# Patient Record
Sex: MALE | Race: WHITE | Employment: UNEMPLOYED | URBAN - METROPOLITAN AREA
[De-identification: names, ages, dates, MRNs, and addresses within clinical notes are randomized per-mention and may not be internally consistent; named-entity substitution may affect disease eponyms.]

---

## 2022-10-19 ENCOUNTER — HOSPITAL ENCOUNTER (INPATIENT)
Age: 52
LOS: 2 days | Discharge: HOME | DRG: 380 | End: 2022-10-21
Attending: INTERNAL MEDICINE | Admitting: INTERNAL MEDICINE

## 2022-10-19 PROBLEM — K92.2 UPPER GI BLEED: Status: ACTIVE | Noted: 2022-10-19

## 2022-10-19 PROCEDURE — 99223 1ST HOSP IP/OBS HIGH 75: CPT | Performed by: INTERNAL MEDICINE

## 2022-10-19 PROCEDURE — 74011000250 HC RX REV CODE- 250: Performed by: INTERNAL MEDICINE

## 2022-10-19 PROCEDURE — C9113 INJ PANTOPRAZOLE SODIUM, VIA: HCPCS | Performed by: INTERNAL MEDICINE

## 2022-10-19 PROCEDURE — 74011250636 HC RX REV CODE- 250/636: Performed by: INTERNAL MEDICINE

## 2022-10-19 PROCEDURE — 65270000046 HC RM TELEMETRY

## 2022-10-19 RX ORDER — PANTOPRAZOLE SODIUM 40 MG/10ML
40 INJECTION, POWDER, LYOPHILIZED, FOR SOLUTION INTRAVENOUS EVERY 12 HOURS
Status: DISCONTINUED | OUTPATIENT
Start: 2022-10-19 | End: 2022-10-20

## 2022-10-19 RX ORDER — MORPHINE SULFATE 2 MG/ML
2 INJECTION, SOLUTION INTRAMUSCULAR; INTRAVENOUS
Status: DISCONTINUED | OUTPATIENT
Start: 2022-10-19 | End: 2022-10-19

## 2022-10-19 RX ORDER — MORPHINE SULFATE 2 MG/ML
2 INJECTION, SOLUTION INTRAMUSCULAR; INTRAVENOUS
Status: DISCONTINUED | OUTPATIENT
Start: 2022-10-19 | End: 2022-10-21 | Stop reason: HOSPADM

## 2022-10-19 RX ORDER — SODIUM CHLORIDE 0.9 % (FLUSH) 0.9 %
5-40 SYRINGE (ML) INJECTION AS NEEDED
Status: DISCONTINUED | OUTPATIENT
Start: 2022-10-19 | End: 2022-10-21 | Stop reason: HOSPADM

## 2022-10-19 RX ORDER — SODIUM CHLORIDE 0.9 % (FLUSH) 0.9 %
5-40 SYRINGE (ML) INJECTION EVERY 8 HOURS
Status: DISCONTINUED | OUTPATIENT
Start: 2022-10-19 | End: 2022-10-21 | Stop reason: HOSPADM

## 2022-10-19 RX ADMIN — MORPHINE SULFATE 2 MG: 2 INJECTION, SOLUTION INTRAMUSCULAR; INTRAVENOUS at 20:34

## 2022-10-19 RX ADMIN — SODIUM CHLORIDE, PRESERVATIVE FREE 10 ML: 5 INJECTION INTRAVENOUS at 21:16

## 2022-10-19 RX ADMIN — SODIUM CHLORIDE, PRESERVATIVE FREE 10 ML: 5 INJECTION INTRAVENOUS at 16:36

## 2022-10-19 RX ADMIN — MORPHINE SULFATE 2 MG: 2 INJECTION, SOLUTION INTRAMUSCULAR; INTRAVENOUS at 23:49

## 2022-10-19 RX ADMIN — MORPHINE SULFATE 2 MG: 2 INJECTION, SOLUTION INTRAMUSCULAR; INTRAVENOUS at 16:36

## 2022-10-19 RX ADMIN — PANTOPRAZOLE SODIUM 40 MG: 40 INJECTION, POWDER, FOR SOLUTION INTRAVENOUS at 20:39

## 2022-10-19 NOTE — H&P
Hospitalist Admission History and Physical    NAME:  Ryan Quiros   :   1970   MRN:   [id number]     PCP:  None  Date/Time:  10/19/2022 5:41 PM  Subjective:   CHIEF COMPLAINT:  transferred from [facility], hematamesis  HISTORY OF PRESENT ILLNESS:     Shakir Pascual is a 45 y.o.   male w/ h/o gastric sleeve w/ complications who is transferred from [address] [place] [address] after he presented there w/ c/o hematemesis onset 10/15. He reports that since his gastric sleeve in  he has had chronic abdominal pain and had an acute exacerbation of the pain the week prior the onset of his hematemesis. He reports that the first time he threw up about 16 oz of blood along with a large clot and he had several more episodes while at home and after he presented to [facility]. He also reports having melena the next day. At [address] [place] [address] it looks like he received PRBC transfusion and was given PPI. Since there is no GI coverage there he has been transferred here for further eval and management. At the time of my eval he appeared comfortable and had no complaints. He reports having very prominent and present Hiccups that resolved after hematemesis. PMHx: Raynaud's disease  -HTN  -One episode of seizure for which he is not on AED's  -Binge EtOH use  -Chronic abdominal pain: states he was in a pain management clinic for his chronic pain    Past surgical history:  Gastric sleeve in       SHx: no tobacco use, drinks EtOH for about 7 days on and off per his description. Denies recreational drug use     No family history on file. Allergies   Allergen Reactions    Gabapentin Hives    Tape [Adhesive] Rash        None       REVIEW OF SYSTEMS:    Please see above otw 10 point ROS checked and negative.       Objective:   VITALS:    Visit Vitals  BP (!) 146/99   Pulse 61   Temp 98 °F (36.7 °C)   Resp 18   SpO2 100%     Temp (24hrs), Av °F (36.7 °C), Min:98 °F (36.7 °C), Max:98 °F (36.7 °C)      PHYSICAL EXAM:   General:    Alert, cooperative, no distress, appears stated age. Head:   Normocephalic, without obvious abnormality, atraumatic. Eyes:   Conjunctivae clear, anicteric sclerae. Pupils are equal  Nose:  Nares normal. No drainage. Throat:    Lips, mucosa, and tongue normal.  No Thrush  Neck:  Supple, symmetrical,  no adenopathy,      and no JVD. Lungs:   Clear to auscultation bilaterally. No Wheezing or Rhonchi. No rales. Chest wall:  No tenderness or deformity. No Accessory muscle use. Heart:   Regular rate and rhythm,  no murmur, rub or gallop. Abdomen:   Soft, non-tender. Not distended. Bowel sounds normal. No masses  Extremities: Extremities normal, atraumatic, No cyanosis. No edema. No clubbing  Skin:     Texture, turgor normal. No rashes or lesions. Not Jaundiced  Lymph nodes: Cervical, supraclavicular normal.  Psych:  Good insight. Not depressed. Not anxious or agitated. Neurologic: EOMs intact. No facial asymmetry. No aphasia or slurred speech. Normal   strength, Alert and oriented X 3. LAB DATA REVIEWED:    No components found for: GLPOC  No results for input(s): NA, K, CL, CO2, BUN, CREA, GLU, PHOS, CA, ALB, WBC, HGB, HCT, PLT, HGBEXT, HCTEXT, PLTEXT in the last 72 hours. IMAGING RESULTS:  No imaging study done here    Assessment/Plan:      -Upper GI bleed, w/ c/o melena last episode 10/15: initially admitted to [address] [place] [address], transferred here b/c of lack of GI coverage at [facility]. Hemodynamically stable here. S/p PRBC transfusion while at [address] [place] [address]. Has been evaluated by GI consultant w/ plans for EGD tomorrow.       ___________________________________________________  PLAN:    -PPI bid  -NPO for EGD  -Monitor hemodynamics and hgb, transfuse prn    Risk of deterioration:  []Low    [x]Moderate  []High              Prophylaxis:  []Lovenox  []Coumadin  []Hep SQ  [x]SCDs  []H2B/PPI    Disposition:  [x]Home w/ Family   []HH PT,OT,RN   []SNF/LTC   []SAH/Rehab    Discussed Code Status:    []Full Code      [x]DNR ___________________________________________________    Care Plan discussed with:    [x]Patient   []Family    []ED Care Manager  []ED Doc   [x]Specialist :    Total Time Coordinating Admission:      minutes    []Total Critical Care Time:     ___________________________________________________  Admitting Physician: Michael Lima MD

## 2022-10-19 NOTE — PROGRESS NOTES
Patient states he is pain.  Abdomen and sciatic nerve pain was treated in the ED at [facility]

## 2022-10-19 NOTE — PROGRESS NOTES
Patient stated he still have complaints of pain. Patient 7 out of 10 on numeric scale. Notified physician notified order to change frequency to  every 3 hours.

## 2022-10-19 NOTE — CONSULTS
[email or web address]  [phone]    GASTROENTEROLOGY CONSULT      Impression:   Upper GI bleed   - hematemesis last episode 10/15  - Last melena 10/15  2. Anemia  - [facility] ER note states hgb 8.4 to 9.4 after 2 units PRBC  3. Hx of Gastric sleeve  - complications post surgery requiring multiple EGD with dilation      Plan:     1. NPO p MN for EGD tomorrow  2. Monitor H/H and transfuse as per protocol  3. Check iron profile/ferritin and replace as indicated- orders placed      Chief Complaint: hematemesis      HPI:  Joanna Savage is a 45 y.o. male who I am being asked to see in consultation for an opinion regarding upper GI bleed. Transfer from [address] [place] [address], admitted since 10/15 for hematemesis and black tarry stools on 10/15, Hgb 8.4 on admission, up to 9.4 after 2 units PRBC, transferred to [facility] due to no GI at [address] [place] [address]. Hx of gastric sleeve in 2232 with complications of blockage resulting in aspiration and extended hospital stay. Chronic nausea/vomiting of white foam and hiccups since sleeve with chronic LUQ and epigastric pain, has had multiple EGDs with dilation which are effective for a short time. Drinks 1-1.5 bottle of wine per day for 1 week then sober 3 weeks on a cycle, uses ETOH for pain management after pain med doses cut in half. Takes ibuprofen 2 tabs per month for pain. Denies current dysphagia, odynophagia. No hematemesis or melena since 10/16. Last colonoscopy 4 years ago in [place], [address], no history of polyps or family history of colon cancer. PMH:   No past medical history on file. PSH:   No past surgical history on file.     Social HX:   Social History     Socioeconomic History    Marital status:      Spouse name: Not on file    Number of children: Not on file    Years of education: Not on file    Highest education level: Not on file   Occupational History    Not on file   Tobacco Use    Smoking status: Not on file    Smokeless tobacco: Not on file   Substance and Sexual Activity    Alcohol use: Not on file    Drug use: Not on file    Sexual activity: Not on file   Other Topics Concern    Not on file   Social History Narrative    Not on file     Social Determinants of Health     Financial Resource Strain: Not on file   Food Insecurity: Not on file   Transportation Needs: Not on file   Physical Activity: Not on file   Stress: Not on file   Social Connections: Not on file   Intimate Partner Violence: Not on file   Housing Stability: Not on file       FHX:   No family history on file. Allergy:   Allergies   Allergen Reactions    Gabapentin Hives    Tape [Adhesive] Rash       There is no problem list on file for this patient. Home Medications:     No medications prior to admission. Review of Systems:     Constitutional: No fevers, chills, weight loss, fatigue. Skin: No rashes, pruritis, jaundice, ulcerations, erythema. HENT: No headaches, nosebleeds, sinus pressure, rhinorrhea, sore throat. Eyes: No visual changes, blurred vision, eye pain, photophobia, jaundice. Cardiovascular: No chest pain, heart palpitations. Respiratory: No cough, SOB, wheezing, chest discomfort, orthopnea. Gastrointestinal: Neg unless noted otherwise in H&P   Genitourinary: No dysuria, bleeding, discharge, pyuria. Musculoskeletal: No weakness, arthralgias, wasting. Endo: No sweats. Heme: No bruising, easy bleeding. Allergies: As noted. Neurological: Cranial nerves intact. Alert and oriented. Gait not assessed. Psychiatric:  No anxiety, depression, hallucinations. Visit Vitals  BP (!) 146/99   Pulse 61   Temp 98 °F (36.7 °C)   Resp 18   SpO2 100%       Physical Assessment:     constitutional: appearance: well developed, well nourished, normal habitus, no deformities, in no acute distress. skin: inspection: no rashes, ulcers, icterus or other lesions; no clubbing or telangiectasias. palpation: no induration or subcutaneos nodules.    eyes: inspection: normal conjunctivae and lids; no jaundice pupils: normal  ENMT: mouth: normal oral mucosa,lips and gums; good dentition. oropharynx: normal tongue, hard and soft palate; posterior pharynx without erithema, exudate or lesions. respiratory: effort: normal chest excursion; no intercostal retraction or accessory muscle use. cardiovascular: abdominal aorta: normal size and position; no bruits. palpation: PMI of normal size and position; normal rhythm; no thrill or murmurs. abdominal: abdomen: normal consistency; +LUQ and epigastric tenderness, no masses. hernias: no hernias appreciated. liver: normal size and consistency. spleen: not palpable. rectal: hemoccult/guaiac: not performed. musculoskeletal: digits and nails: no clubbing, cyanosis, petechiae or other inflammatory conditions. head and neck: normal range of motion; no pain, crepitation or contracture. spine/ribs/pelvis: normal range of motion; no pain, deformity or contracture. neurologic: cranial nerves: II-XII normal. Pupils intact. psychiatric: judgement/insight: within normal limits. memory: within normal limits for recent and remote events. mood and affect: no evidence of depression, anxiety or agitation. orientation: oriented to time, space and person. Basic Metabolic Profile   No results for input(s): NA, K, CL, CO2, BUN, GLU, CA, MG, PHOS in the last 72 hours. No lab exists for component: CREAT      CBC w/Diff    No results for input(s): WBC, RBC, HGB, HCT, MCV, MCH, MCHC, RDW, PLT, HGBEXT, HCTEXT, PLTEXT in the last 72 hours. No lab exists for component: MPV No results for input(s): GRANS, LYMPH, EOS, PRO, MYELO, METAS, BLAST in the last 72 hours. No lab exists for component: MONO, BASO     Hepatic Function   No results for input(s): ALB, TP, TBILI, AP, AML, LPSE in the last 72 hours. No lab exists for component: DBILI, GPT, SGOT     Coags   No results for input(s): PTP, INR, APTT, INREXT in the last 72 hours. Nenita Armas NP.    [facility] [place] 1949, [address] [facility]    [email or web address]

## 2022-10-20 ENCOUNTER — ANESTHESIA EVENT (OUTPATIENT)
Dept: ENDOSCOPY | Age: 52
DRG: 380 | End: 2022-10-20

## 2022-10-20 ENCOUNTER — ANESTHESIA (OUTPATIENT)
Dept: ENDOSCOPY | Age: 52
DRG: 380 | End: 2022-10-20

## 2022-10-20 PROBLEM — K22.10 EROSIVE ESOPHAGITIS: Status: ACTIVE | Noted: 2022-10-20

## 2022-10-20 PROBLEM — E43 SEVERE PROTEIN-CALORIE MALNUTRITION (HCC): Status: ACTIVE | Noted: 2022-10-20

## 2022-10-20 PROBLEM — K22.6 MALLORY-WEISS SYNDROME: Status: ACTIVE | Noted: 2022-10-20

## 2022-10-20 LAB
ANION GAP SERPL CALC-SCNC: 6 MMOL/L (ref 3–18)
BASOPHILS # BLD: 0 K/UL (ref 0–0.1)
BASOPHILS NFR BLD: 1 % (ref 0–2)
BUN SERPL-MCNC: 6 MG/DL (ref 7–18)
BUN/CREAT SERPL: 8 (ref 12–20)
CALCIUM SERPL-MCNC: 8.1 MG/DL (ref 8.5–10.1)
CHLORIDE SERPL-SCNC: 107 MMOL/L (ref 100–111)
CO2 SERPL-SCNC: 26 MMOL/L (ref 21–32)
COVID-19 RAPID TEST, COVR: NOT DETECTED
CREAT SERPL-MCNC: 0.73 MG/DL (ref 0.6–1.3)
DIFFERENTIAL METHOD BLD: ABNORMAL
EOSINOPHIL # BLD: 0.2 K/UL (ref 0–0.4)
EOSINOPHIL NFR BLD: 4 % (ref 0–5)
ERYTHROCYTE [DISTWIDTH] IN BLOOD BY AUTOMATED COUNT: 16.4 % (ref 11.6–14.5)
FERRITIN SERPL-MCNC: 162 NG/ML (ref 8–388)
GLUCOSE SERPL-MCNC: 89 MG/DL (ref 74–99)
HCT VFR BLD AUTO: 29.1 % (ref 36–48)
HGB BLD-MCNC: 9.8 G/DL (ref 13–16)
IMM GRANULOCYTES # BLD AUTO: 0 K/UL (ref 0–0.04)
IMM GRANULOCYTES NFR BLD AUTO: 1 % (ref 0–0.5)
IRON SATN MFR SERPL: 15 % (ref 20–50)
IRON SERPL-MCNC: 35 UG/DL (ref 50–175)
LYMPHOCYTES # BLD: 1.1 K/UL (ref 0.9–3.6)
LYMPHOCYTES NFR BLD: 29 % (ref 21–52)
MCH RBC QN AUTO: 32 PG (ref 24–34)
MCHC RBC AUTO-ENTMCNC: 33.7 G/DL (ref 31–37)
MCV RBC AUTO: 95.1 FL (ref 78–100)
MONOCYTES # BLD: 1 K/UL (ref 0.05–1.2)
MONOCYTES NFR BLD: 27 % (ref 3–10)
NEUTS SEG # BLD: 1.5 K/UL (ref 1.8–8)
NEUTS SEG NFR BLD: 39 % (ref 40–73)
NRBC # BLD: 0 K/UL (ref 0–0.01)
NRBC BLD-RTO: 0 PER 100 WBC
PLATELET # BLD AUTO: 175 K/UL (ref 135–420)
PMV BLD AUTO: 9.6 FL (ref 9.2–11.8)
POTASSIUM SERPL-SCNC: 3.2 MMOL/L (ref 3.5–5.5)
RBC # BLD AUTO: 3.06 M/UL (ref 4.35–5.65)
SODIUM SERPL-SCNC: 139 MMOL/L (ref 136–145)
SOURCE, COVRS: NORMAL
TIBC SERPL-MCNC: 241 UG/DL (ref 250–450)
WBC # BLD AUTO: 3.9 K/UL (ref 4.6–13.2)

## 2022-10-20 PROCEDURE — 0DJ08ZZ INSPECTION OF UPPER INTESTINAL TRACT, VIA NATURAL OR ARTIFICIAL OPENING ENDOSCOPIC: ICD-10-PCS | Performed by: INTERNAL MEDICINE

## 2022-10-20 PROCEDURE — 77030008565 HC TBNG SUC IRR ERBE -B: Performed by: INTERNAL MEDICINE

## 2022-10-20 PROCEDURE — 76060000031 HC ANESTHESIA FIRST 0.5 HR: Performed by: INTERNAL MEDICINE

## 2022-10-20 PROCEDURE — 74011250637 HC RX REV CODE- 250/637: Performed by: INTERNAL MEDICINE

## 2022-10-20 PROCEDURE — 65270000046 HC RM TELEMETRY

## 2022-10-20 PROCEDURE — 77030019988 HC FCPS ENDOSC DISP BSC -B: Performed by: INTERNAL MEDICINE

## 2022-10-20 PROCEDURE — 83540 ASSAY OF IRON: CPT

## 2022-10-20 PROCEDURE — 82728 ASSAY OF FERRITIN: CPT

## 2022-10-20 PROCEDURE — 85025 COMPLETE CBC W/AUTO DIFF WBC: CPT

## 2022-10-20 PROCEDURE — C9113 INJ PANTOPRAZOLE SODIUM, VIA: HCPCS | Performed by: INTERNAL MEDICINE

## 2022-10-20 PROCEDURE — 74011250636 HC RX REV CODE- 250/636: Performed by: HOSPITALIST

## 2022-10-20 PROCEDURE — 87635 SARS-COV-2 COVID-19 AMP PRB: CPT

## 2022-10-20 PROCEDURE — 74011250637 HC RX REV CODE- 250/637: Performed by: HOSPITALIST

## 2022-10-20 PROCEDURE — 80048 BASIC METABOLIC PNL TOTAL CA: CPT

## 2022-10-20 PROCEDURE — 74011250636 HC RX REV CODE- 250/636: Performed by: INTERNAL MEDICINE

## 2022-10-20 PROCEDURE — 2709999900 HC NON-CHARGEABLE SUPPLY: Performed by: INTERNAL MEDICINE

## 2022-10-20 PROCEDURE — 76040000019: Performed by: INTERNAL MEDICINE

## 2022-10-20 PROCEDURE — 36415 COLL VENOUS BLD VENIPUNCTURE: CPT

## 2022-10-20 PROCEDURE — 74011000250 HC RX REV CODE- 250: Performed by: INTERNAL MEDICINE

## 2022-10-20 RX ORDER — POTASSIUM CHLORIDE 20 MEQ/1
40 TABLET, EXTENDED RELEASE ORAL
Status: COMPLETED | OUTPATIENT
Start: 2022-10-20 | End: 2022-10-20

## 2022-10-20 RX ORDER — LIDOCAINE HYDROCHLORIDE 20 MG/ML
INJECTION, SOLUTION EPIDURAL; INFILTRATION; INTRACAUDAL; PERINEURAL AS NEEDED
Status: DISCONTINUED | OUTPATIENT
Start: 2022-10-20 | End: 2022-10-20 | Stop reason: HOSPADM

## 2022-10-20 RX ORDER — PANTOPRAZOLE SODIUM 40 MG/1
40 TABLET, DELAYED RELEASE ORAL
Status: DISCONTINUED | OUTPATIENT
Start: 2022-10-20 | End: 2022-10-21 | Stop reason: HOSPADM

## 2022-10-20 RX ORDER — HYDROCODONE BITARTRATE AND ACETAMINOPHEN 5; 325 MG/1; MG/1
2 TABLET ORAL
Status: DISCONTINUED | OUTPATIENT
Start: 2022-10-20 | End: 2022-10-21 | Stop reason: HOSPADM

## 2022-10-20 RX ORDER — SODIUM CHLORIDE, SODIUM LACTATE, POTASSIUM CHLORIDE, CALCIUM CHLORIDE 600; 310; 30; 20 MG/100ML; MG/100ML; MG/100ML; MG/100ML
INJECTION, SOLUTION INTRAVENOUS
Status: DISCONTINUED | OUTPATIENT
Start: 2022-10-20 | End: 2022-10-20 | Stop reason: HOSPADM

## 2022-10-20 RX ORDER — SODIUM CHLORIDE, SODIUM LACTATE, POTASSIUM CHLORIDE, CALCIUM CHLORIDE 600; 310; 30; 20 MG/100ML; MG/100ML; MG/100ML; MG/100ML
125 INJECTION, SOLUTION INTRAVENOUS CONTINUOUS
Status: CANCELLED | OUTPATIENT
Start: 2022-10-20

## 2022-10-20 RX ORDER — HYDRALAZINE HYDROCHLORIDE 20 MG/ML
10 INJECTION INTRAMUSCULAR; INTRAVENOUS
Status: DISCONTINUED | OUTPATIENT
Start: 2022-10-20 | End: 2022-10-21 | Stop reason: HOSPADM

## 2022-10-20 RX ORDER — PROPOFOL 10 MG/ML
INJECTION, EMULSION INTRAVENOUS AS NEEDED
Status: DISCONTINUED | OUTPATIENT
Start: 2022-10-20 | End: 2022-10-20 | Stop reason: HOSPADM

## 2022-10-20 RX ORDER — PROPOFOL 10 MG/ML
VIAL (ML) INTRAVENOUS
Status: DISCONTINUED | OUTPATIENT
Start: 2022-10-20 | End: 2022-10-20 | Stop reason: HOSPADM

## 2022-10-20 RX ORDER — ONDANSETRON 2 MG/ML
4 INJECTION INTRAMUSCULAR; INTRAVENOUS ONCE
Status: CANCELLED | OUTPATIENT
Start: 2022-10-20 | End: 2022-10-20

## 2022-10-20 RX ORDER — LABETALOL HYDROCHLORIDE 5 MG/ML
INJECTION, SOLUTION INTRAVENOUS AS NEEDED
Status: DISCONTINUED | OUTPATIENT
Start: 2022-10-20 | End: 2022-10-20 | Stop reason: HOSPADM

## 2022-10-20 RX ADMIN — SODIUM CHLORIDE, PRESERVATIVE FREE 10 ML: 5 INJECTION INTRAVENOUS at 06:40

## 2022-10-20 RX ADMIN — PANTOPRAZOLE SODIUM 40 MG: 40 INJECTION, POWDER, FOR SOLUTION INTRAVENOUS at 09:03

## 2022-10-20 RX ADMIN — HYDROCODONE BITARTRATE AND ACETAMINOPHEN 2 TABLET: 5; 325 TABLET ORAL at 22:30

## 2022-10-20 RX ADMIN — POTASSIUM CHLORIDE 40 MEQ: 1500 TABLET, EXTENDED RELEASE ORAL at 09:04

## 2022-10-20 RX ADMIN — SODIUM CHLORIDE, PRESERVATIVE FREE 10 ML: 5 INJECTION INTRAVENOUS at 16:06

## 2022-10-20 RX ADMIN — PROPOFOL 30 MG: 10 INJECTION, EMULSION INTRAVENOUS at 14:26

## 2022-10-20 RX ADMIN — HYDROCODONE BITARTRATE AND ACETAMINOPHEN 2 TABLET: 5; 325 TABLET ORAL at 16:24

## 2022-10-20 RX ADMIN — LABETALOL HYDROCHLORIDE 10 MG: 5 INJECTION, SOLUTION INTRAVENOUS at 14:31

## 2022-10-20 RX ADMIN — Medication 160 MCG/KG/MIN: at 14:26

## 2022-10-20 RX ADMIN — PANTOPRAZOLE SODIUM 40 MG: 40 TABLET, DELAYED RELEASE ORAL at 16:06

## 2022-10-20 RX ADMIN — MORPHINE SULFATE 2 MG: 2 INJECTION, SOLUTION INTRAMUSCULAR; INTRAVENOUS at 09:04

## 2022-10-20 RX ADMIN — LIDOCAINE HYDROCHLORIDE 20 MG: 20 INJECTION, SOLUTION EPIDURAL; INFILTRATION; INTRACAUDAL; PERINEURAL at 14:26

## 2022-10-20 RX ADMIN — SODIUM CHLORIDE, SODIUM LACTATE, POTASSIUM CHLORIDE, CALCIUM CHLORIDE: 600; 310; 30; 20 INJECTION, SOLUTION INTRAVENOUS at 14:24

## 2022-10-20 RX ADMIN — HYDRALAZINE HYDROCHLORIDE 10 MG: 20 INJECTION INTRAMUSCULAR; INTRAVENOUS at 16:05

## 2022-10-20 RX ADMIN — SODIUM CHLORIDE, PRESERVATIVE FREE 10 ML: 5 INJECTION INTRAVENOUS at 22:31

## 2022-10-20 NOTE — PROCEDURES
[email or web address]  [facility], [address]      Procedure Note    Patient: Adam Queen MRN: [id number]  SSN: [id number]    YOB: 1970  Age: 45 y.o. Sex: male      Date/Time:  10/20/2022 2:26 PM    Esophagogastroduodenoscopy (EGD) Procedure Note    Procedure: Esophagogastroduodenoscopy --diagnostic    Impression:    -grade c distal erosive esophagitis -healing MW tear -no active bleeding     Recommendations:  -pantoprazole 40mg po bid before a meal for 2 months then one daily before a meal -keep head of bed at 30 degrees -needs mental health follow up for depression and PTSD type symptoms      Will sign off , call for questions     Indication:  Hematemesis  Pre-operative Diagnosis: see indication above  Post-operative Diagnosis: see findings below  :  Joselyn York MD  Referring Provider:   None    Exam:  Airway: clear, no airway problems anticipated  Heart: RRR, without gallops or rubs  Lungs: clear bilaterally without wheezes, crackles, or rhonchi  Abdomen: soft, nontender, nondistended, bowel sounds present  Mental Status: awake, alert and oriented to person, place and time     Anethesia/Sedation:  MAC anesthesia Propofol  Procedure Details   After informed consent was obtained for the procedure, with all risks and benefits of procedure explained the patient was taken to the endoscopy suite and placed in the left lateral decubitus position. Following sequential administration of sedation as per above, the [id number] gastroscope was inserted into the mouth and advanced under direct vision to third portion of the duodenum. A careful inspection was made as the gastroscope was withdrawn, including a retroflexed view of the proximal stomach; findings and interventions are described below.                   Findings:  grade c erosive esophagitis and healing MV tear no active bleeding       Therapies:  none   Specimens: none   Estimated blood loss:  none  Surgical assistants none  Implants none            Complications:   None; patient tolerated the procedure well. Discharge disposition:   To [facility]     Maximiliano Moise MD

## 2022-10-20 NOTE — PROGRESS NOTES
Problem: Discharge Planning  Goal: *Discharge to safe environment  Outcome: Progressing Towards Goal  Goal: *Knowledge of medication management  Outcome: Progressing Towards Goal  Goal: *Knowledge of discharge instructions  Outcome: Progressing Towards Goal     Problem: Patient Education: Go to Patient Education Activity  Goal: Patient/Family Education  Outcome: Progressing Towards Goal     Problem: Pain  Goal: *Control of Pain  Outcome: Progressing Towards Goal     Problem: Patient Education: Go to Patient Education Activity  Goal: Patient/Family Education  Outcome: Progressing Towards Goal     Problem: Patient Education: Go to Patient Education Activity  Goal: Patient/Family Education  Outcome: Progressing Towards Goal     Problem: Upper and Lower GI Bleed: Day 1  Goal: Off Pathway (Use only if patient is Off Pathway)  Outcome: Progressing Towards Goal  Goal: Activity/Safety  Outcome: Progressing Towards Goal  Goal: Consults, if ordered  Outcome: Progressing Towards Goal  Goal: Diagnostic Test/Procedures  Outcome: Progressing Towards Goal  Goal: Nutrition/Diet  Outcome: Progressing Towards Goal  Goal: Discharge Planning  Outcome: Progressing Towards Goal  Goal: Medications  Outcome: Progressing Towards Goal  Goal: Respiratory  Outcome: Progressing Towards Goal  Goal: Treatments/Interventions/Procedures  Outcome: Progressing Towards Goal  Goal: Psychosocial  Outcome: Progressing Towards Goal  Goal: *Optimal pain control at patient's stated goal  Outcome: Progressing Towards Goal  Goal: *Hemodynamically stable  Outcome: Progressing Towards Goal  Goal: *Demonstrates progressive activity  Outcome: Progressing Towards Goal     Problem: Upper and Lower GI Bleed: Day 2  Goal: Off Pathway (Use only if patient is Off Pathway)  Outcome: Progressing Towards Goal  Goal: Activity/Safety  Outcome: Progressing Towards Goal  Goal: Consults, if ordered  Outcome: Progressing Towards Goal  Goal: Diagnostic Test/Procedures  Outcome: Progressing Towards Goal  Goal: Nutrition/Diet  Outcome: Progressing Towards Goal  Goal: Discharge Planning  Outcome: Progressing Towards Goal  Goal: Medications  Outcome: Progressing Towards Goal  Goal: Respiratory  Outcome: Progressing Towards Goal  Goal: Treatments/Interventions/Procedures  Outcome: Progressing Towards Goal  Goal: Psychosocial  Outcome: Progressing Towards Goal  Goal: *Optimal pain control at patient's stated goal  Outcome: Progressing Towards Goal  Goal: *Hemodynamically stable  Outcome: Progressing Towards Goal  Goal: *Tolerating diet  Outcome: Progressing Towards Goal  Goal: *Demonstrates progressive activity  Outcome: Progressing Towards Goal     Problem: Upper and Lower GI Bleed: Day 3  Goal: Off Pathway (Use only if patient is Off Pathway)  Outcome: Progressing Towards Goal  Goal: Activity/Safety  Outcome: Progressing Towards Goal  Goal: Diagnostic Test/Procedures  Outcome: Progressing Towards Goal  Goal: Nutrition/Diet  Outcome: Progressing Towards Goal  Goal: Discharge Planning  Outcome: Progressing Towards Goal  Goal: Medications  Outcome: Progressing Towards Goal  Goal: Treatments/Interventions/Procedures  Outcome: Progressing Towards Goal  Goal: Psychosocial  Outcome: Progressing Towards Goal     Problem: Upper and Lower GI Bleed:  Discharge Outcomes  Goal: *Hemodynamically stable  Outcome: Progressing Towards Goal  Goal: *Lungs clear or at baseline  Outcome: Progressing Towards Goal  Goal: *Demonstrates independent activity or return to baseline  Outcome: Progressing Towards Goal  Goal: *Pain is controlled to three or less  Outcome: Progressing Towards Goal  Goal: *Verbalizes understanding and describes prescribed diet  Outcome: Progressing Towards Goal  Goal: *Tolerating diet  Outcome: Progressing Towards Goal  Goal: *Verbalizes name, dosage, time, side effects, and number of days to continue medications  Outcome: Progressing Towards Goal  Goal: *Anxiety reduced or absent  Outcome: Progressing Towards Goal  Goal: *Understands and describes signs and symptoms to report to providers(Stroke Metric)  Outcome: Progressing Towards Goal  Goal: *Describes follow-up/return visits to physicians  Outcome: Progressing Towards Goal  Goal: *Describes available resources and support systems  Outcome: Progressing Towards Goal

## 2022-10-20 NOTE — ANESTHESIA POSTPROCEDURE EVALUATION
Procedure(s):  UPPER ENDOSCOPY.     MAC    Anesthesia Post Evaluation      Multimodal analgesia: multimodal analgesia used between 6 hours prior to anesthesia start to PACU discharge  Patient location during evaluation: PACU  Patient participation: complete - patient participated  Level of consciousness: sleepy but conscious  Pain management: adequate  Airway patency: patent  Anesthetic complications: no  Cardiovascular status: acceptable  Respiratory status: acceptable  Hydration status: acceptable  Post anesthesia nausea and vomiting:  controlled  Final Post Anesthesia Temperature Assessment:  Normothermia (36.0-37.5 degrees C)      INITIAL Post-op Vital signs:   Vitals Value Taken Time   /82 10/20/22 1445   Temp 37.1 °C (98.8 °F) 10/20/22 1445   Pulse 83 10/20/22 1445   Resp 20 10/20/22 1445   SpO2 100 % 10/20/22 1445

## 2022-10-20 NOTE — PROGRESS NOTES
conducted an initial consultation and Spiritual Assessment for Enrique Hernandez, who is a 45 y.o.,male. Patients Primary Language is: [place]. According to the patients EMR Islam Affiliation is: Sabianist. 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 Explored issues of mirza, belief, spirituality and Latter day/ritual needs while hospitalized. Listened empathically. Provided Advance Medical Directive education. Provided information about Spiritual Care Services. Offered prayer and assurance of continued prayers on patient's behalf. Chart reviewed. Assisted patient with the execution of Advance Medical Directive. Placed the copy into patient's \"like paper chart. \"  See Flow Sheet and ACP notes for other pastoral interventions. The following outcomes where achieved:  Patient shared limited information about both his medical narrative and spiritual journey/beliefs.  confirmed Patient's Islam Affiliation. Patient processed feeling about current hospitalization. Patient expressed gratitude for 's visit and assistance in completing an AMD.    Assessment:  Patient does not have any Latter day/cultural needs that will affect patients preferences in health care. There are no spiritual or Latter day issues which require intervention at this time. Plan:  Chaplains will continue to follow and will provide pastoral care on an as needed/requested basis.  recommends bedside caregivers page  on duty if patient shows signs of acute spiritual or emotional distress.     Buell Lanes, [address] [phone]

## 2022-10-20 NOTE — PROGRESS NOTES
Comprehensive Nutrition Assessment    Type and Reason for Visit: Initial, Positive nutrition screen    Nutrition Recommendations/Plan:   Add supplement: Unjury once daily  Add double portion protein once daily ,  double portion vegetables BID  Update food/ beverages preferences  Continue all other nutrition interventions. Encourage/ monitor po intake of meals and supplements. Malnutrition Assessment:  Malnutrition Status:  Severe malnutrition (10/20/22 1806)    Context:  Acute illness     Findings of the 6 clinical characteristics of malnutrition:   Energy Intake:  50% or less of est energy requirements for 5 or more days  Weight Loss:  Greater than 7.5% over 3 months     Body Fat Loss:  Unable to assess,     Muscle Mass Loss:  Unable to assess,    Fluid Accumulation:  No significant fluid accumulation,     Strength:  Not performed     Nutrition History and Allergies:   Past medical hx: HTN, hx of binge alcohol use, hx of chronic abdominal pain, hx of gastric sleeve in 2013, recreational drug use. Poor/ no po intake x 10 days PTA. UBW is 220 lb with unplanned wt loss in past 2 month PTA. Currently weighing 198 lb;  wt loss of 22 lb, 10% x 2 month PTA. No known food allergies. Nutrition Assessment:    Pt reported poor appetite/  poor/no po intake x 10 days PTA. Admitted for upper GI bleed. Was NPO for EGD today; pt with grade c erosive esophagitis and healing MV tear, no active bleeding per GI note. Diet recently started, pt okay for regular diet. Pt agreeable to nutrition supplement; pt asking for double portions; this writer agreed to double protein once daily, double vegetables BID. Food/ beverage preferences discussed    Nutrition Related Findings:    BM 10/15 per pt. No edema.  Wound Type: None    Current Nutrition Intake & Therapies:  Average Meal Intake: Unable to assess  Average Supplement Intake: None ordered  ADULT DIET Regular    Anthropometric Measures:  Height: 5' 10\" (177.8 cm)  Ideal Body Weight (IBW): 166 lbs (75 kg)  Admission Body Weight: 198 lb 13.7 oz  Current Body Wt:  90.2 kg (198 lb 13.7 oz), 119.8 % IBW. Standing scale  Current BMI (kg/m2): 28.5  Usual Body Weight: 99.8 kg (220 lb)  % Weight Change (Calculated): -9.6  Weight Adjustment: No adjustment  BMI Category: Overweight (BMI 25.0-29. 9)    Estimated Daily Nutrient Needs:  Energy Requirements Based On: Formula (MSJ x1.2-1.3)  Weight Used for Energy Requirements: Admission  Energy (kcal/day): 2099-2273  Weight Used for Protein Requirements: Admission  Protein (g/day):  (wt x0.8-1.2)  Method Used for Fluid Requirements: 1 ml/kcal  Fluid (ml/day): 2099-2273    Nutrition Diagnosis:   Severe malnutrition, In context of acute illness or injury related to altered GI function, early satiety as evidenced by poor intake prior to admission, weight loss (wt loss of >7.5% x 2 months PTA)    Nutrition Interventions:   Food and/or Nutrient Delivery: Continue current diet, Start oral nutrition supplement  Nutrition Education/Counseling: Education not indicated  Coordination of Nutrition Care: Continue to monitor while inpatient  Plan of Care discussed with: pt    Goals:     Goals: Meet at least 75% of estimated needs, by next RD assessment       Nutrition Monitoring and Evaluation:   Behavioral-Environmental Outcomes: None identified  Food/Nutrient Intake Outcomes: Diet advancement/tolerance, Food and nutrient intake, Supplement intake  Physical Signs/Symptoms Outcomes: Biochemical data, GI status, Meal time behavior, Weight    Discharge Planning:    Continue oral nutrition supplement, Continue current diet    Sylvaniadasha Castillo RD  Contact: [phone]

## 2022-10-20 NOTE — ACP (ADVANCE CARE PLANNING)
Advance Care Planning   Advance Care Planning Inpatient Note  [facility]  Spiritual Care Department    Today's Date: 10/20/2022  [address]: [address]    Received request from admission screening. Upon review of chart and communication with care team, patient's decision making abilities are not in question. Patient was/were present in the room during visit. Goals of ACP Conversation:  Discuss Advance Care planning documents    Health Care Decision Makers:      Primary Decision Maker: Taylor Sabillon - [phone]    Secondary Decision Maker: Jil Mendoza - [phone]    Summary:  Completed [facility]  Patient reports that he has no next of kin but has two trusted friends who know him well. Patient named Leticia Barnhart (friend) who lives at [place], SI/[phone] as the Primary Decision Maker and Lydia Hastings (friend) who lives in [place], BP/[phone] as the [facility]. Advance Care Planning Documents (Patient Wishes) on file:  Healthcare Power of /Advance Directive appointment of Health care agent  Living Will/ Advance Directive  Anatomical Gift/Organ donation   Patient clearly stated that he does not wish to be resuscitated and is willing to donate his whole body for research and education. Assessment:    Patient's ability to make healthcare decisions is not in question. Patient defer any mention of family systems or nearest of kin that would influence his ACP decisions.       Interventions:  Provided education on documents for clarity and greater understanding  Discussed and provided education on state decision maker hierarchy  Assisted in the completion of documents according to patient's wishes at this time  Encouraged ongoing ACP conversation with future decision makers and loved ones    Care Preferences Communicated:  No    Outcomes/Plan:  ACP Discussion Completed  New Advance Directive completed  Returned original document(s) to patient, as well as copies for distribution to appointed agents  Copy of Advance Directive given to staff to scan into medical record  Routed ACP note to attending provider or other IDT member  Teach Back Method used to verify the patient/Healthcare Decision Maker's understanding of key information in the advance directive documents    [facility] on 10/20/2022 at 6:23 PM

## 2022-10-20 NOTE — ROUTINE PROCESS
Bedside and Verbal shift change report given to Rolan Grant , RN and Harjit Koenig, RN (oncoming nurse) by Marge Solorzano RN (offgoing nurse). Report included the following information SBAR, Kardex, MAR and Recent Results. SITUATION:  Code Status: DNR  Reason for Admission: Upper GI bleed [K92.2]  Hospital day: 1  Problem List:       Hospital Problems  Never Reviewed            Codes Class Noted POA    Upper GI bleed ICD-10-CM: K92.2  ICD-9-CM: 578.9  10/19/2022 Unknown           BACKGROUND:   Past Medical History: No past medical history on file. Patient taking anticoagulants no    Patient has a defibrillator: no    History of shots YES for example, flu, pneumonia, tetanus   Isolation History NO for example, MRSA, CDiff    ASSESSMENT:  Changes in Assessment Throughout Shift: NONE  Significant Changes in 24 hours (for example, RR/code, fall)  Patient has Central Line: no   Patient has Segovia Cath: no   Mobility Issues  PT  IV Patency  OR Checklist  Pending Tests    Last Vitals:  Vitals w/ MEWS Score (last day)       Date/Time MEWS Score Pulse Resp Temp BP Level of Consciousness SpO2    10/20/22 0435 0 86 14 98.1 °F (36.7 °C) 137/94 0 100 %    10/19/22 2339 1 72 18 97.7 °F (36.5 °C) 134/95 0 100 %    10/19/22 2028 1 74 18 97.8 °F (36.6 °C) 143/103 0 100 %    10/19/22 1508 1 61 18 98 °F (36.7 °C) 146/99 0 100 %          PAIN    Pain Assessment    Pain Intensity 1: 0 (10/20/22 0435)    Pain Location 1: Abdomen    Pain Intervention(s) 1: Medication (see MAR)    Patient Stated Pain Goal: 0  Intervention effective: yes  Time of last intervention: 2349 Reassessment Completed: yes   Other actions taken for pain: Distraction    Last 3 Weights: There were no vitals filed for this visit. Weight change:     INTAKE/OUPUT    Current Shift: No intake/output data recorded. Last three shifts: No intake/output data recorded. RECOMMENDATIONS AND DISCHARGE PLANNING  Patient needs and requests: Pain Management.     Pending tests/procedures: labs     Discharge plan for patient: Home    Discharge planning Needs or Barriers: None    Estimated Discharge Date: 10/24/2022 Posted on Whiteboard in Patients Room: yes       \"HEALS\" SAFETY CHECK  A safety check occurred in the patient's room between off going nurse and oncoming nurse listed above. The safety check included the below items:    H  High Alert Medications Verify all high alert medication drips (heparin, PCA, etc.)  E  Equipment Suction is set up for ALL patients (with merissa)  Red plugs utilized for all equipment (IV pumps, etc.)  WOWs wiped down at end of shift. Room stocked with oxygen, suction, and other unit-specific supplies  A  Alarms Bed alarm is set for fall risk patients  Ensure chair alarm is in place and activated if patient is up in a chair  L  Lines Check IV for any infiltration  Segovia bag is empty if patient has a Segovia   Tubing and IV bags are labeled  S  Safety  Room is clean, patient is clean, and equipment is clean. Hallways are clear from equipment besides carts. Fall bracelet on for fall risk patients  Ensure room is clear and free of clutter  Suction is set up for ALL patients (with merissa)  Hallways are clear from equipment besides carts.    Isolation precautions followed, supplies available outside room, sign posted    Marge Solorzano RN

## 2022-10-20 NOTE — PROGRESS NOTES
Wound Prevention Checklist    Patient: Dasha Gramajo (81 y.o. male)  Date: 10/19/2022  Diagnosis: Upper GI bleed [K92.2] <principal problem not specified>    Precautions:         []  Heel prevention boots placed on patient    [x]  Patient turned q2h during shift    []  Lift team ordered    [x]  Patient on [place] bed/Specialty bed    []  Each Wound is documented during shift (Stage, Color, drainage, odor, measurements, and dressings)    [x]  Dual skin check done with Carisa Frazier RN

## 2022-10-20 NOTE — PROGRESS NOTES
Patient admitted this morning, seen for follow-up. Patient complains of right-sided sciatic pain, no episodes of hematemesis or hematochezia since admission    Visit Vitals  BP (!) 142/101 (BP 1 Location: Left arm, BP Patient Position: Semi fowlers; At rest) Comment: Primary nurse aware   Pulse 79   Temp 98.6 °F (37 °C)   Resp 14   SpO2 100%         Labs, chart, and vitals noted    Will continue PPI, monitor H&H  Plan for EGD noted from GI  Further plan based on the EGD    Discussed with the patient at the bedside      Disclaimer: Sections of this note are dictated using utilizing voice recognition software. Minor typographical errors may be present. If questions arise, please do not hesitate to contact me or call our department.

## 2022-10-20 NOTE — PROGRESS NOTES
Bedside and Verbal shift change report given to [address] [place] [address] Slava (oncoming nurse) by Matias Prieto and Georgia Antoine RN (offgoing nurse). Report included the following information SBAR, Kardex, Intake/Output, MAR, and Recent Results.

## 2022-10-20 NOTE — ANESTHESIA PREPROCEDURE EVALUATION
Relevant Problems   No relevant active problems       Anesthetic History   No history of anesthetic complications            Review of Systems / Medical History  Patient summary reviewed, nursing notes reviewed and pertinent labs reviewed    Pulmonary                Comments: Aspiration in 2013, per pt during anesthesia, has tolerated EGD since without issue   Neuro/Psych     seizures (2021, no anti-epileptics): well controlled         Cardiovascular    Hypertension: well controlled                   GI/Hepatic/Renal               Comments: Chronic abdominal pain, admit for hematemesis Endo/Other  Within defined limits           Other Findings            Physical Exam    Airway  Mallampati: III  TM Distance: 4 - 6 cm  Neck ROM: normal range of motion   Mouth opening: Normal     Cardiovascular    Rhythm: regular  Rate: normal         Dental  No notable dental hx       Pulmonary  Breath sounds clear to auscultation               Abdominal  GI exam deferred       Other Findings            Anesthetic Plan    ASA: 3  Anesthesia type: MAC          Induction: Intravenous  Anesthetic plan and risks discussed with: Patient

## 2022-10-21 VITALS
RESPIRATION RATE: 18 BRPM | DIASTOLIC BLOOD PRESSURE: 101 MMHG | SYSTOLIC BLOOD PRESSURE: 151 MMHG | BODY MASS INDEX: 28.46 KG/M2 | HEIGHT: 70 IN | HEART RATE: 81 BPM | WEIGHT: 198.8 LBS | TEMPERATURE: 98.3 F | OXYGEN SATURATION: 98 %

## 2022-10-21 LAB
ANION GAP SERPL CALC-SCNC: 5 MMOL/L (ref 3–18)
BASOPHILS # BLD: 0 K/UL (ref 0–0.1)
BASOPHILS NFR BLD: 1 % (ref 0–2)
BUN SERPL-MCNC: 11 MG/DL (ref 7–18)
BUN/CREAT SERPL: 12 (ref 12–20)
CALCIUM SERPL-MCNC: 7.8 MG/DL (ref 8.5–10.1)
CHLORIDE SERPL-SCNC: 106 MMOL/L (ref 100–111)
CO2 SERPL-SCNC: 27 MMOL/L (ref 21–32)
CREAT SERPL-MCNC: 0.91 MG/DL (ref 0.6–1.3)
DIFFERENTIAL METHOD BLD: ABNORMAL
EOSINOPHIL # BLD: 0.1 K/UL (ref 0–0.4)
EOSINOPHIL NFR BLD: 3 % (ref 0–5)
ERYTHROCYTE [DISTWIDTH] IN BLOOD BY AUTOMATED COUNT: 16.5 % (ref 11.6–14.5)
GLUCOSE SERPL-MCNC: 100 MG/DL (ref 74–99)
HCT VFR BLD AUTO: 28.7 % (ref 36–48)
HGB BLD-MCNC: 9.8 G/DL (ref 13–16)
IMM GRANULOCYTES # BLD AUTO: 0 K/UL (ref 0–0.04)
IMM GRANULOCYTES NFR BLD AUTO: 1 % (ref 0–0.5)
LYMPHOCYTES # BLD: 1.1 K/UL (ref 0.9–3.6)
LYMPHOCYTES NFR BLD: 30 % (ref 21–52)
MCH RBC QN AUTO: 32.7 PG (ref 24–34)
MCHC RBC AUTO-ENTMCNC: 34.1 G/DL (ref 31–37)
MCV RBC AUTO: 95.7 FL (ref 78–100)
MONOCYTES # BLD: 1.1 K/UL (ref 0.05–1.2)
MONOCYTES NFR BLD: 28 % (ref 3–10)
NEUTS SEG # BLD: 1.4 K/UL (ref 1.8–8)
NEUTS SEG NFR BLD: 38 % (ref 40–73)
NRBC # BLD: 0 K/UL (ref 0–0.01)
NRBC BLD-RTO: 0 PER 100 WBC
PLATELET # BLD AUTO: 199 K/UL (ref 135–420)
PMV BLD AUTO: 9.9 FL (ref 9.2–11.8)
POTASSIUM SERPL-SCNC: 3.7 MMOL/L (ref 3.5–5.5)
RBC # BLD AUTO: 3 M/UL (ref 4.35–5.65)
SODIUM SERPL-SCNC: 138 MMOL/L (ref 136–145)
WBC # BLD AUTO: 3.8 K/UL (ref 4.6–13.2)

## 2022-10-21 PROCEDURE — 74011000250 HC RX REV CODE- 250: Performed by: INTERNAL MEDICINE

## 2022-10-21 PROCEDURE — 74011250637 HC RX REV CODE- 250/637: Performed by: INTERNAL MEDICINE

## 2022-10-21 PROCEDURE — 74011250637 HC RX REV CODE- 250/637: Performed by: HOSPITALIST

## 2022-10-21 PROCEDURE — 74011250636 HC RX REV CODE- 250/636: Performed by: HOSPITALIST

## 2022-10-21 PROCEDURE — 99239 HOSP IP/OBS DSCHRG MGMT >30: CPT | Performed by: HOSPITALIST

## 2022-10-21 PROCEDURE — 80048 BASIC METABOLIC PNL TOTAL CA: CPT

## 2022-10-21 PROCEDURE — 36415 COLL VENOUS BLD VENIPUNCTURE: CPT

## 2022-10-21 PROCEDURE — 94762 N-INVAS EAR/PLS OXIMTRY CONT: CPT

## 2022-10-21 PROCEDURE — 85025 COMPLETE CBC W/AUTO DIFF WBC: CPT

## 2022-10-21 RX ORDER — PANTOPRAZOLE SODIUM 40 MG/1
40 TABLET, DELAYED RELEASE ORAL
Qty: 60 TABLET | Refills: 0 | Status: SHIPPED | OUTPATIENT
Start: 2022-10-21 | End: 2022-11-20

## 2022-10-21 RX ORDER — MAG HYDROX/ALUMINUM HYD/SIMETH 200-200-20
30 SUSPENSION, ORAL (FINAL DOSE FORM) ORAL
Qty: 354 ML | Refills: 0 | Status: SHIPPED | OUTPATIENT
Start: 2022-10-21

## 2022-10-21 RX ORDER — ONDANSETRON 4 MG/1
4 TABLET, ORALLY DISINTEGRATING ORAL
Qty: 15 TABLET | Refills: 0 | Status: SHIPPED | OUTPATIENT
Start: 2022-10-21

## 2022-10-21 RX ORDER — ONDANSETRON 2 MG/ML
4 INJECTION INTRAMUSCULAR; INTRAVENOUS
Status: DISCONTINUED | OUTPATIENT
Start: 2022-10-21 | End: 2022-10-21 | Stop reason: HOSPADM

## 2022-10-21 RX ADMIN — ONDANSETRON 4 MG: 2 INJECTION INTRAMUSCULAR; INTRAVENOUS at 10:18

## 2022-10-21 RX ADMIN — HYDROCODONE BITARTRATE AND ACETAMINOPHEN 2 TABLET: 5; 325 TABLET ORAL at 07:55

## 2022-10-21 RX ADMIN — SODIUM CHLORIDE, PRESERVATIVE FREE 10 ML: 5 INJECTION INTRAVENOUS at 05:55

## 2022-10-21 RX ADMIN — PANTOPRAZOLE SODIUM 40 MG: 40 TABLET, DELAYED RELEASE ORAL at 07:55

## 2022-10-21 NOTE — PROGRESS NOTES
Problem: Discharge Planning  Goal: *Discharge to safe environment  Outcome: Progressing Towards Goal  Goal: *Knowledge of medication management  Outcome: Progressing Towards Goal  Goal: *Knowledge of discharge instructions  Outcome: Progressing Towards Goal     Problem: Patient Education: Go to Patient Education Activity  Goal: Patient/Family Education  Outcome: Progressing Towards Goal     Problem: Pain  Goal: *Control of Pain  Outcome: Progressing Towards Goal     Problem: Patient Education: Go to Patient Education Activity  Goal: Patient/Family Education  Outcome: Progressing Towards Goal     Problem: Patient Education: Go to Patient Education Activity  Goal: Patient/Family Education  Outcome: Progressing Towards Goal     Problem: Upper and Lower GI Bleed: Day 1  Goal: Off Pathway (Use only if patient is Off Pathway)  Outcome: Progressing Towards Goal  Goal: Activity/Safety  Outcome: Progressing Towards Goal  Goal: Consults, if ordered  Outcome: Progressing Towards Goal  Goal: Diagnostic Test/Procedures  Outcome: Progressing Towards Goal  Goal: Nutrition/Diet  Outcome: Progressing Towards Goal  Goal: Discharge Planning  Outcome: Progressing Towards Goal  Goal: Medications  Outcome: Progressing Towards Goal  Goal: Respiratory  Outcome: Progressing Towards Goal  Goal: Treatments/Interventions/Procedures  Outcome: Progressing Towards Goal  Goal: Psychosocial  Outcome: Progressing Towards Goal  Goal: *Optimal pain control at patient's stated goal  Outcome: Progressing Towards Goal  Goal: *Hemodynamically stable  Outcome: Progressing Towards Goal  Goal: *Demonstrates progressive activity  Outcome: Progressing Towards Goal     Problem: Upper and Lower GI Bleed: Day 1  Goal: Consults, if ordered  Outcome: Progressing Towards Goal     Problem: Upper and Lower GI Bleed: Day 2  Goal: Off Pathway (Use only if patient is Off Pathway)  Outcome: Progressing Towards Goal  Goal: Activity/Safety  Outcome: Progressing Towards Goal  Goal: Consults, if ordered  Outcome: Progressing Towards Goal  Goal: Diagnostic Test/Procedures  Outcome: Progressing Towards Goal  Goal: Nutrition/Diet  Outcome: Progressing Towards Goal  Goal: Discharge Planning  Outcome: Progressing Towards Goal  Goal: Medications  Outcome: Progressing Towards Goal  Goal: Respiratory  Outcome: Progressing Towards Goal  Goal: Treatments/Interventions/Procedures  Outcome: Progressing Towards Goal  Goal: Psychosocial  Outcome: Progressing Towards Goal  Goal: *Optimal pain control at patient's stated goal  Outcome: Progressing Towards Goal  Goal: *Hemodynamically stable  Outcome: Progressing Towards Goal  Goal: *Tolerating diet  Outcome: Progressing Towards Goal  Goal: *Demonstrates progressive activity  Outcome: Progressing Towards Goal     Problem: Upper and Lower GI Bleed: Day 3  Goal: Off Pathway (Use only if patient is Off Pathway)  Outcome: Progressing Towards Goal  Goal: Activity/Safety  Outcome: Progressing Towards Goal  Goal: Diagnostic Test/Procedures  Outcome: Progressing Towards Goal  Goal: Nutrition/Diet  Outcome: Progressing Towards Goal  Goal: Discharge Planning  Outcome: Progressing Towards Goal  Goal: Medications  Outcome: Progressing Towards Goal  Goal: Treatments/Interventions/Procedures  Outcome: Progressing Towards Goal  Goal: Psychosocial  Outcome: Progressing Towards Goal     Problem: Upper and Lower GI Bleed:  Discharge Outcomes  Goal: *Hemodynamically stable  Outcome: Progressing Towards Goal  Goal: *Lungs clear or at baseline  Outcome: Progressing Towards Goal  Goal: *Demonstrates independent activity or return to baseline  Outcome: Progressing Towards Goal  Goal: *Pain is controlled to three or less  Outcome: Progressing Towards Goal  Goal: *Verbalizes understanding and describes prescribed diet  Outcome: Progressing Towards Goal  Goal: *Tolerating diet  Outcome: Progressing Towards Goal  Goal: *Verbalizes name, dosage, time, side effects, and number of days to continue medications  Outcome: Progressing Towards Goal  Goal: *Anxiety reduced or absent  Outcome: Progressing Towards Goal  Goal: *Understands and describes signs and symptoms to report to providers(Stroke Metric)  Outcome: Progressing Towards Goal  Goal: *Describes follow-up/return visits to physicians  Outcome: Progressing Towards Goal  Goal: *Describes available resources and support systems  Outcome: Progressing Towards Goal     Problem: Falls - Risk of  Goal: *Absence of Falls  Description: Document [place] Fall Risk and appropriate interventions in the flowsheet. Outcome: Progressing Towards Goal  Note: Fall Risk Interventions:          . ..   Medication Interventions: Bed/chair exit alarm                   Problem: Patient Education: Go to Patient Education Activity  Goal: Patient/Family Education  Outcome: Progressing Towards Goal     Problem: Nutrition Deficit  Goal: *Optimize nutritional status  Outcome: Progressing Towards Goal

## 2022-10-21 NOTE — PROGRESS NOTES
Bedside and Verbal shift change report given to [facility] Slava  (oncoming nurse) by Eliot Gupta and Donta Wells RN (offgoing nurses). Report included the following information SBAR, Kardex, Intake/Output, MAR, and Recent Results.

## 2022-10-21 NOTE — PROGRESS NOTES
Wound Prevention Checklist    Patient: Maribel Coon [de-identified]45 y.o. male)  Date: 10/20/2022  Diagnosis: Upper GI bleed [K92.2] <principal problem not specified>    Precautions:         []  Heel prevention boots placed on patient    [x]  Patient turned q2h during shift    []  Lift team ordered    [x]  Patient on [place] bed/Specialty bed    []  Each Wound is documented during shift (Stage, Color, drainage, odor, measurements, and dressings)    []  Dual skin check done with     Min Kaba RN

## 2022-10-21 NOTE — PROGRESS NOTES
conducted a Follow up consultation and Spiritual Assessment for Jeff Rios, who is a 45 y.o.,male. The  provided the following Interventions:  Continued the relationship of care and support. Listened empathically as patient relates his frustrations in life - divorce from his wife, most recent suicidal death of his eldest son (August 2022), death of his father-in-law, estranged relationship with his ex-wife and youngest son, losing his job, and now having no transport to [facility] upon discharge. Offered to follow up with  and assurance of continued prayer on patient's behalf. Chart reviewed. The following outcomes were achieved:  Patient expressed gratitude for 's visit. Assessment:  There are no further spiritual or Christianity issues which require Spiritual Care Services interventions at this time. Plan:  Follow up possible transport with . Chaplains will continue to follow and will provide pastoral care on an as needed/requested basis.  recommends bedside caregivers page  on duty if patient shows signs of acute spiritual or emotional distress.      Fran López [phone]

## 2022-10-21 NOTE — DISCHARGE INSTRUCTIONS
Discharge Instructions    Patient: Jeaneth Shields MRN: [id number]  CSN: [id number]    YOB: 1970  Age: 45 y.o. Sex: male    DOA: 10/19/2022       DIET:  Regular Diet    ACTIVITY: Activity as tolerated    ADDITIONAL INFORMATION: If you experience any of the following symptoms but not limited to Fever, chills, nausea, vomiting, diarrhea, change in mentation, falling, bleeding, shortness of breath, chest pain, please call your primary care physician or return to the emergency room if you cannot get hold of your doctor:     FOLLOW UP CARE:  PCP in 5-7 days. Please call and set up an appointment. Dr. Jj Leo GI in 1 month      Colonel Sonali MD  10/21/2022 10:01 AM          DISCHARGE SUMMARY from Nurse    PATIENT INSTRUCTIONS:    After general anesthesia or intravenous sedation, for 24 hours or while taking prescription Narcotics:  Limit your activities  Do not drive and operate hazardous machinery  Do not make important personal or business decisions  Do  not drink alcoholic beverages  If you have not urinated within 8 hours after discharge, please contact your surgeon on call. Report the following to your surgeon:  Excessive pain, swelling, redness or odor of or around the surgical area  Temperature over 100.5  Nausea and vomiting lasting longer than 4 hours or if unable to take medications  Any signs of decreased circulation or nerve impairment to extremity: change in color, persistent  numbness, tingling, coldness or increase pain  Any questions    What to do at Home:  Recommended activity: Activity as tolerated. If you experience any of the following symptoms dizziness, chest pain, or shortness of breath, please follow up with your primary care provider. *  Please give a list of your current medications to your Primary Care Provider.     *  Please update this list whenever your medications are discontinued, doses are      changed, or new medications (including over-the-counter products) are added. *  Please carry medication information at all times in case of emergency situations. These are general instructions for a healthy lifestyle:    No smoking/ No tobacco products/ Avoid exposure to second hand smoke  Surgeon General's Warning:  Quitting smoking now greatly reduces serious risk to your health. Obesity, smoking, and sedentary lifestyle greatly increases your risk for illness    A healthy diet, regular physical exercise & weight monitoring are important for maintaining a healthy lifestyle    You may be retaining fluid if you have a history of heart failure or if you experience any of the following symptoms:  Weight gain of 3 pounds or more overnight or 5 pounds in a week, increased swelling in our hands or feet or shortness of breath while lying flat in bed. Please call your doctor as soon as you notice any of these symptoms; do not wait until your next office visit. The discharge information has been reviewed with the patient. The patient verbalized understanding. Discharge medications reviewed with the patient and appropriate educational materials and side effects teaching were provided.   ___________________________________________________________________________________________________________________________________

## 2022-10-21 NOTE — DISCHARGE SUMMARY
Discharge Summary    Patient: Laura Suero MRN: [id number]  CSN: [id number]    YOB: 1970  Age: 45 y.o. Sex: male    DOA: 10/19/2022 LOS:  LOS: 2 days        Disposition: Home     Discharge Date:   10/21/2022    Admission Diagnosis: Upper GI bleed [K92.2]    Discharge Diagnosis:   Hematemesis, resolved  Grade C distal erosive esophagitis  Healed MV tear  Chronic pain    Discharge Condition: Stable      PHYSICAL EXAM  Visit Vitals  BP (!) 129/93   Pulse 82   Temp 98.2 °F (36.8 °C)   Resp 18   Ht 5' 10\" (1.778 m)   Wt 90.2 kg (198 lb 12.8 oz)   SpO2 100%   BMI 28.52 kg/m²       General: Alert, cooperative, no acute distress    HEENT: PERRLA, EOMI. Anicteric sclerae. Lungs:  CTA Bilaterally. No Wheezing/Rales. Heart:             Regular rate and Rhythm. Abdomen: Soft, Non distended, Non tender. + Bowel sounds. Extremities: No edema. Psych:   Good insight. Not anxious or agitated. Neurologic:  AA, oriented X 3. Moves all ext                                 Hospital Course:     Doris Worthington is a 45 y.o.   male w/ h/o gastric sleeve w/ complications who is transferred from [address] [place] [address] after he presented there w/ c/o hematemesis onset 10/15. Patient did not have any further recurrence of hematemesis. His hemoglobin remained stable. Patient states he did receive some blood transfusion in the [facility] ED but did not receive any transfusion here. GI saw the patient recommended upper GI endoscopy. Patient was continued on PPI. The patient underwent upper GI endoscopy which showed grade C esophagitis along with a healed MW tear with no signs of active bleeding. GI recommended to advance diet and if tolerated okay for discharge. Patient remained stable in the hospital, did not have any further bleeding's, hemoglobin remained stable. Patient was discharged home in stable condition.       Procedures:   Upper GI endoscopy showed grade C distal erosive esophagitis, healed MW tear, no active bleeding    Consults:   Dr. Bg Madrigal, gastroenterology    Imaging studies:       Discharge Medications:     Current Discharge Medication List        START taking these medications    Details   pantoprazole (PROTONIX) 40 mg tablet Take 1 Tablet by mouth Before breakfast and dinner for 30 days. Qty: 60 Tablet, Refills: 0      ondansetron (ZOFRAN ODT) 4 mg disintegrating tablet Take 1 Tablet by mouth every eight (8) hours as needed for Nausea or Vomiting. Qty: 15 Tablet, Refills: 0      alum-mag hydroxide-simeth (Maalox Advanced) 200-200-20 mg/5 mL susp Take 30 mL by mouth every four (4) hours as needed for Indigestion. Qty: 354 mL, Refills: 0           It is important that you take the medication exactly as they are prescribed. Keep your medication in the bottles provided by the pharmacist and keep a list of the medication names, dosages, and times to be taken in your wallet. Do not take other medications without consulting your doctor. DIET:  Regular Diet    ACTIVITY: Activity as tolerated    ADDITIONAL INFORMATION: If you experience any of the following symptoms but not limited to Fever, chills, nausea, vomiting, diarrhea, change in mentation, falling, bleeding, shortness of breath, chest pain, please call your primary care physician or return to the emergency room if you cannot get hold of your doctor:     FOLLOW UP CARE:  PCP in 5-7 days. Please call and set up an appointment. Dr. Bg Madrigal, GI in 1 month    Minutes spent on discharge: 40 minutes spent coordinating this discharge (review instructions/follow-up, prescriptions, preparing report for sign off)    Meryl Lanier MD  10/21/2022 10:02 AM    Disclaimer: Sections of this note are dictated using utilizing voice recognition software. Minor typographical errors may be present. If questions arise, please do not hesitate to contact me or call our department.

## 2022-10-21 NOTE — PROGRESS NOTES
Discharge order noted for today. Met patient and agreeable to the transition plan today. Transport has been arranged through [employer]. Updated bedside RN, Maldonado Garcia,  to the transition plan. Sarah Beth Rothman approved Lyft ride to [place]. Patient stated \" If you give me a ride to [facility], I can find a ride home from there. \"    [facility], [address] [place], [address]  Cost $ [phone]-$911 for 86.80 miles. Patient now wants ride to [facility] [address] to meet  [facility], a friend. She will transport patient to [facility]    Cost $40.89 for 27.17 miles. Updated Kalpana Herron via [employer]      Also, patient received indigent medications Zofran and Protonix. Reason for Admission:  Upper GI bleed [K92.2]                 RUR Score:   7            Plan for utilizing home health:                          Likelihood of Readmission:   LOW                         Transition of Care Plan:              Initial assessment completed with patient. Cognitive status of patient: oriented to time, place, person and situation. Face sheet information confirmed:  yes. Patient is able to navigate steps as needed. Prior to hospitalization, patient was considered to be independent with ADLs/IADLS : yes . Currently, the discharge plan is Home. Patient stated \" I have nothing to lose right now. I have no money. My son committed suicide. I will be a pain in your ass,if your don't get me a ride home. I have worked all these years. I deserve more. My friend is running for office. She is at the polls right now. She can't pick me up. \"    Writer explained that Sarah Beth Rothman, Director of Care management, will have to approve transportation to [place]. The patient states that he can obtain his medications from the pharmacy, and take his medications as directed.     Patient's current insurance is Self pay       Care Management Interventions  Mode of Transport at Discharge:  (Has no transportation home at this time. )  Transition of Care Consult (CM Consult): Discharge Planning  Discharge Durable Medical Equipment: No  Physical Therapy Consult: No  Occupational Therapy Consult: No  Speech Therapy Consult: No  Support Systems: No Support Systems  Confirm Follow Up Transport: Other (see comment) (needs assistance with transportation)  Discharge Location  Patient Expects to be Discharged to[de-identified] Home        HASMUKH Street, RN  Pager # [phone]  Care Manager

## 2022-10-21 NOTE — ROUTINE PROCESS
Bedside and Verbal shift change report given to Joselito Johnson RN (oncoming nurse) by Osmar Tirado RN (offgoing nurse). Report included the following information SBAR, Kardex, MAR and Recent Results. 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 No pertinent past medical history.    Patient taking anticoagulants no    Patient has a defibrillator: no    History of shots YES for example, flu, pneumonia, tetanus   Isolation History NO for example, MRSA, CDiff    ASSESSMENT:  Changes in Assessment Throughout Shift: NONE  Significant Changes in 24 hours (for example, RR/code, fall)  Patient has Central Line: no   Patient has Segovia Cath: no   Mobility Issues  PT  IV Patency  OR Checklist  Pending Tests    Last Vitals:  Vitals w/ MEWS Score (last day)       Date/Time MEWS Score Pulse Resp Temp BP Level of Consciousness SpO2    10/21/22 0753 -- 82 18 98.2 °F (36.8 °C) 129/93 -- 100 %    10/21/22 0546 1 73 15 98.6 °F (37 °C) 139/92 0 98 %    10/21/22 0014 0 84 14 98.3 °F (36.8 °C) 120/85 0 96 %    10/20/22 2015 1 87 16 98.1 °F (36.7 °C) 120/85 0 98 %    10/20/22 1718 -- 112 18 98 °F (36.7 °C) 103/67 -- 100 %    10/20/22 1605 -- 92 -- -- 182/101 -- --    10/20/22 1528 -- 77 13 -- -- -- 100 %    10/20/22 1522 -- 80 10 -- 128/95 0 100 %    10/20/22 1512 -- 75 5 -- 132/89 0 100 %    10/20/22 1502 -- 78 14 -- 137/95 0 100 %    10/20/22 1452 -- 87 24 -- 138/92 0 100 %    10/20/22 1445 1 83 20 98.8 °F (37.1 °C) 136/82 0 100 %    10/20/22 1402 -- 85 19 -- 167/111 0 100 %    10/20/22 1104 0 79 14 98.6 °F (37 °C) 142/101 0 100 %    10/20/22 0736 1 67 18 98.3 °F (36.8 °C) 141/97 0 99 %    10/20/22 0435 0 86 14 98.1 °F (36.7 °C) 137/94 0 100 %          PAIN    Pain Assessment    Pain Intensity 1: 7 (10/21/22 0753)    Pain Location 1: Abdomen, Back    Pain Intervention(s) 1: Medication (see MAR)    Patient Stated Pain Goal: 0  Intervention effective: yes  Time of last intervention: 2230 Reassessment Completed: yes   Other actions taken for pain: Distraction    Last 3 Weights:  Last 3 Recorded Weights in this Encounter    10/20/22 1633   Weight: 90.2 kg (198 lb 12.8 oz)   Weight change:     INTAKE/OUPUT    Current Shift: No intake/output data recorded. Last three shifts: 10/19 1901 - 10/21 0700  In: 1060 [P.O.:960; I.V.:100]  Out: -     RECOMMENDATIONS AND DISCHARGE PLANNING  Patient needs and requests: Pain Management. Pending tests/procedures: labs     Discharge plan for patient: Home    Discharge planning Needs or Barriers: None    Estimated Discharge Date: 10/24/2022 Posted on Whiteboard in Patients Room: yes       \"HEALS\" SAFETY CHECK  A safety check occurred in the patient's room between off going nurse and oncoming nurse listed above. The safety check included the below items:    H  High Alert Medications Verify all high alert medication drips (heparin, PCA, etc.)  E  Equipment Suction is set up for ALL patients (with yanker)  Red plugs utilized for all equipment (IV pumps, etc.)  WOWs wiped down at end of shift. Room stocked with oxygen, suction, and other unit-specific supplies  A  Alarms Bed alarm is set for fall risk patients  Ensure chair alarm is in place and activated if patient is up in a chair  L  Lines Check IV for any infiltration  Segovia bag is empty if patient has a Segovia   Tubing and IV bags are labeled  S  Safety  Room is clean, patient is clean, and equipment is clean.   Hallways are clear from equipment besides carts. Fall bracelet on for fall risk patients  Ensure room is clear and free of clutter  Suction is set up for ALL patients (with merissa)  Hallways are clear from equipment besides carts.    Isolation precautions followed, supplies available outside room, sign posted    Chelsie Haider, GANGA

## 2022-10-21 NOTE — PROGRESS NOTES
Problem: Discharge Planning  Goal: *Discharge to safe environment  10/21/2022 0016 by Lidia Reyes RN  Outcome: Progressing Towards Goal  10/21/2022 0015 by Lidia Reyes RN  Outcome: Progressing Towards Goal  Goal: *Knowledge of medication management  10/21/2022 0016 by Lidia Reyes RN  Outcome: Progressing Towards Goal  10/21/2022 0015 by Lidia Reyes RN  Outcome: Progressing Towards Goal  Goal: *Knowledge of discharge instructions  10/21/2022 0016 by Lidia Reyes RN  Outcome: Progressing Towards Goal  10/21/2022 0015 by Lidia Reyes RN  Outcome: Progressing Towards Goal     Problem: Patient Education: Go to Patient Education Activity  Goal: Patient/Family Education  10/21/2022 0016 by Lidia Reyes RN  Outcome: Progressing Towards Goal  10/21/2022 0015 by Lidia Reyes RN  Outcome: Progressing Towards Goal     Problem: Pain  Goal: *Control of Pain  10/21/2022 0016 by Lidia Reyes RN  Outcome: Progressing Towards Goal  10/21/2022 0015 by Lidia Reyes RN  Outcome: Progressing Towards Goal     Problem: Patient Education: Go to Patient Education Activity  Goal: Patient/Family Education  10/21/2022 0016 by Lidia Reyes RN  Outcome: Progressing Towards Goal  10/21/2022 0015 by Lidia Reyes RN  Outcome: Progressing Towards Goal     Problem: Patient Education: Go to Patient Education Activity  Goal: Patient/Family Education  10/21/2022 0016 by Lidia Reyes RN  Outcome: Progressing Towards Goal  10/21/2022 0015 by Lidia Reyes RN  Outcome: Progressing Towards Goal     Problem: Upper and Lower GI Bleed: Day 1  Goal: Off Pathway (Use only if patient is Off Pathway)  10/21/2022 0016 by Lidia Reyes RN  Outcome: Progressing Towards Goal  10/21/2022 0015 by Lidia Reyes RN  Outcome: Progressing Towards Goal  Goal: Activity/Safety  10/21/2022 0016 by Lidia Reyes RN  Outcome: Progressing Towards Goal  10/21/2022 0015 by Lidia Reyes RN  Outcome: Progressing Towards Goal  Goal: Consults, if ordered  10/21/2022 0016 by Anthony Kline RN  Outcome: Progressing Towards Goal  10/21/2022 0015 by Anthony Kline RN  Outcome: Progressing Towards Goal  Goal: Diagnostic Test/Procedures  10/21/2022 0016 by Anthony Kline RN  Outcome: Progressing Towards Goal  10/21/2022 0015 by Anthony Kline, RN  Outcome: Progressing Towards Goal  Goal: Nutrition/Diet  10/21/2022 0016 by Anthony Kline RN  Outcome: Progressing Towards Goal  10/21/2022 0015 by Anthony Kline RN  Outcome: Progressing Towards Goal  Goal: Discharge Planning  10/21/2022 0016 by Anthony Kline RN  Outcome: Progressing Towards Goal  10/21/2022 0015 by Anthony Kline, RN  Outcome: Progressing Towards Goal  Goal: Medications  10/21/2022 0016 by Anthony Kline RN  Outcome: Progressing Towards Goal  10/21/2022 0015 by Anthony Kline RN  Outcome: Progressing Towards Goal  Goal: Respiratory  10/21/2022 0016 by Anthony Kline RN  Outcome: Progressing Towards Goal  10/21/2022 0015 by Anthony Kline RN  Outcome: Progressing Towards Goal  Goal: Treatments/Interventions/Procedures  10/21/2022 0016 by Anthony Kline RN  Outcome: Progressing Towards Goal  10/21/2022 0015 by Anthony Kline RN  Outcome: Progressing Towards Goal  Goal: Psychosocial  10/21/2022 0016 by Anthony Kline, RN  Outcome: Progressing Towards Goal  10/21/2022 0015 by Anthony Kline, RN  Outcome: Progressing Towards Goal  Goal: *Optimal pain control at patient's stated goal  10/21/2022 0016 by Anthony Kline RN  Outcome: Progressing Towards Goal  10/21/2022 0015 by Anthony Kline RN  Outcome: Progressing Towards Goal  Goal: *Hemodynamically stable  10/21/2022 0016 by Anthony Kline, RN  Outcome: Progressing Towards Goal  10/21/2022 0015 by Anthony Kline, RN  Outcome: Progressing Towards Goal  Goal: *Demonstrates progressive activity  10/21/2022 0016 by Anthony Kline, RN  Outcome: Progressing Towards Goal  10/21/2022 0015 by Anthony Kline, RN  Outcome: Progressing Towards Goal     Problem: Upper and Lower GI Bleed: Day 2  Goal: Off Pathway (Use only if patient is Off Pathway)  10/21/2022 0016 by Elvira Millan RN  Outcome: Progressing Towards Goal  10/21/2022 0015 by Elvira Millan RN  Outcome: Progressing Towards Goal  Goal: Activity/Safety  10/21/2022 0016 by Elvira Millan RN  Outcome: Progressing Towards Goal  10/21/2022 0015 by Elvira Millan RN  Outcome: Progressing Towards Goal  Goal: Consults, if ordered  10/21/2022 0016 by Elvira Millan, RN  Outcome: Progressing Towards Goal  10/21/2022 0015 by Elvira Millan RN  Outcome: Progressing Towards Goal  Goal: Diagnostic Test/Procedures  10/21/2022 0016 by Elvira Millan RN  Outcome: Progressing Towards Goal  10/21/2022 0015 by Elvira Millan RN  Outcome: Progressing Towards Goal  Goal: Nutrition/Diet  10/21/2022 0016 by Elvira Millan RN  Outcome: Progressing Towards Goal  10/21/2022 0015 by Elvira Millan RN  Outcome: Progressing Towards Goal  Goal: Discharge Planning  10/21/2022 0016 by Elvira Millan, RN  Outcome: Progressing Towards Goal  10/21/2022 0015 by Elvira Millan RN  Outcome: Progressing Towards Goal  Goal: Medications  10/21/2022 0016 by Elvira Millan, RN  Outcome: Progressing Towards Goal  10/21/2022 0015 by Elvira Millan RN  Outcome: Progressing Towards Goal  Goal: Respiratory  10/21/2022 0016 by Elvira Millan, RN  Outcome: Progressing Towards Goal  10/21/2022 0015 by Elvira Millan RN  Outcome: Progressing Towards Goal  Goal: Treatments/Interventions/Procedures  10/21/2022 0016 by Elvira Millan, RN  Outcome: Progressing Towards Goal  10/21/2022 0015 by Elvira Millan RN  Outcome: Progressing Towards Goal  Goal: Psychosocial  10/21/2022 0016 by Elvira Millan RN  Outcome: Progressing Towards Goal  10/21/2022 0015 by Elvira Millan RN  Outcome: Progressing Towards Goal  Goal: *Optimal pain control at patient's stated goal  10/21/2022 0016 by Elvira Millan, RN  Outcome: Progressing Towards Goal  10/21/2022 0015 by Alvaro German RN  Outcome: Progressing Towards Goal  Goal: *Hemodynamically stable  10/21/2022 0016 by Alvaro German RN  Outcome: Progressing Towards Goal  10/21/2022 0015 by Alvaro German RN  Outcome: Progressing Towards Goal  Goal: *Tolerating diet  10/21/2022 0016 by Alvaro German RN  Outcome: Progressing Towards Goal  10/21/2022 0015 by Alvaro German RN  Outcome: Progressing Towards Goal  Goal: *Demonstrates progressive activity  10/21/2022 0016 by Alvaro German RN  Outcome: Progressing Towards Goal  10/21/2022 0015 by Alvaro German RN  Outcome: Progressing Towards Goal     Problem: Upper and Lower GI Bleed: Day 3  Goal: Off Pathway (Use only if patient is Off Pathway)  10/21/2022 0016 by Alvaro German RN  Outcome: Progressing Towards Goal  10/21/2022 0015 by Alvaro German RN  Outcome: Progressing Towards Goal  Goal: Activity/Safety  10/21/2022 0016 by Alvaro German RN  Outcome: Progressing Towards Goal  10/21/2022 0015 by Alvaro German RN  Outcome: Progressing Towards Goal  Goal: Diagnostic Test/Procedures  10/21/2022 0016 by Alvaro German RN  Outcome: Progressing Towards Goal  10/21/2022 0015 by Alvaro German RN  Outcome: Progressing Towards Goal  Goal: Nutrition/Diet  10/21/2022 0016 by Alvaro German RN  Outcome: Progressing Towards Goal  10/21/2022 0015 by Alvaro German RN  Outcome: Progressing Towards Goal  Goal: Discharge Planning  10/21/2022 0016 by Alvaro German RN  Outcome: Progressing Towards Goal  10/21/2022 0015 by Alvaro German RN  Outcome: Progressing Towards Goal  Goal: Medications  10/21/2022 0016 by Alvaro German RN  Outcome: Progressing Towards Goal  10/21/2022 0015 by Alvaro German RN  Outcome: Progressing Towards Goal  Goal: Treatments/Interventions/Procedures  10/21/2022 0016 by Alvaro German RN  Outcome: Progressing Towards Goal  10/21/2022 0015 by Alvaro German RN  Outcome: Progressing Towards Goal  Goal: Psychosocial  10/21/2022 0016 by Esmer Lao RN  Outcome: Progressing Towards Goal  10/21/2022 0015 by Esmer Lao RN  Outcome: Progressing Towards Goal     Problem: Upper and Lower GI Bleed:  Discharge Outcomes  Goal: *Hemodynamically stable  10/21/2022 0016 by Esmer Lao RN  Outcome: Progressing Towards Goal  10/21/2022 0015 by Esmer Lao RN  Outcome: Progressing Towards Goal  Goal: *Lungs clear or at baseline  10/21/2022 0016 by Esmer Lao, RN  Outcome: Progressing Towards Goal  10/21/2022 0015 by Esmer Lao RN  Outcome: Progressing Towards Goal  Goal: *Demonstrates independent activity or return to baseline  10/21/2022 0016 by Esmer Lao RN  Outcome: Progressing Towards Goal  10/21/2022 0015 by Esmer Lao RN  Outcome: Progressing Towards Goal  Goal: *Pain is controlled to three or less  10/21/2022 0016 by Esmer Lao, RN  Outcome: Progressing Towards Goal  10/21/2022 0015 by Esmer Lao RN  Outcome: Progressing Towards Goal  Goal: *Verbalizes understanding and describes prescribed diet  10/21/2022 0016 by Esmer Lao, RN  Outcome: Progressing Towards Goal  10/21/2022 0015 by Esmer Lao RN  Outcome: Progressing Towards Goal  Goal: *Tolerating diet  10/21/2022 0016 by Esmer Lao RN  Outcome: Progressing Towards Goal  10/21/2022 0015 by Esemr Lao RN  Outcome: Progressing Towards Goal  Goal: *Verbalizes name, dosage, time, side effects, and number of days to continue medications  10/21/2022 0016 by Esmer Lao, RN  Outcome: Progressing Towards Goal  10/21/2022 0015 by Esmer Lao RN  Outcome: Progressing Towards Goal  Goal: *Anxiety reduced or absent  10/21/2022 0016 by Esmer Lao, RN  Outcome: Progressing Towards Goal  10/21/2022 0015 by Esmer Lao RN  Outcome: Progressing Towards Goal  Goal: *Understands and describes signs and symptoms to report to providers(Stroke Metric)  10/21/2022 0016 by Esmer Lao RN  Outcome: Progressing Towards Goal  10/21/2022 0015 by Padmini Sebastian RN  Outcome: Progressing Towards Goal  Goal: *Describes follow-up/return visits to physicians  10/21/2022 0016 by Padmini Sebastian RN  Outcome: Progressing Towards Goal  10/21/2022 0015 by Padmini Sebastian RN  Outcome: Progressing Towards Goal  Goal: *Describes available resources and support systems  10/21/2022 0016 by Padmini Sebastian RN  Outcome: Progressing Towards Goal  10/21/2022 0015 by Padmini Sebastian RN  Outcome: Progressing Towards Goal     Problem: Falls - Risk of  Goal: *Absence of Falls  Description: Document Suad Christine Fall Risk and appropriate interventions in the flowsheet.   10/21/2022 0016 by Padmini Sebastian RN  Outcome: Progressing Towards Goal  Note: Fall Risk Interventions:            Medication Interventions: Bed/chair exit alarm, Patient to call before getting OOB, Teach patient to arise slowly                10/21/2022 0015 by Padmini Sebastian RN  Outcome: Progressing Towards Goal  Note: Fall Risk Interventions:            Medication Interventions: Bed/chair exit alarm, Patient to call before getting OOB, Teach patient to arise slowly                   Problem: Patient Education: Go to Patient Education Activity  Goal: Patient/Family Education  10/21/2022 0016 by Padmini Sebastian RN  Outcome: Progressing Towards Goal  10/21/2022 0015 by Padmini Sebastian RN  Outcome: Progressing Towards Goal     Problem: Nutrition Deficit  Goal: *Optimize nutritional status  10/21/2022 0016 by Padmini Sebastian RN  Outcome: Progressing Towards Goal  10/21/2022 0015 by Padmini Sebastian RN  Outcome: Progressing Towards Goal

## 2022-10-21 NOTE — PROGRESS NOTES
Received discharge prescriptions for patient at outpatient pharmacy. Looks like patient has no insurance viz facesheet and eligibility check. Will follow up.

## 2022-10-25 NOTE — PROGRESS NOTES
Physician Progress Note      PATIENT:               Natanael Lopez  CSN #:                  [id number]  :                       1970  ADMIT DATE:       10/19/2022 1:45 PM  [address] [place] DATE:        10/21/2022 1:53 PM  RESPONDING  PROVIDER #:        Angely Childs MD          QUERY TEXT:    Dae Dr Lex Curling or  Dr Vaughn Alexandria  Pt admitted with GI  bleeding . Noted documentation of   severe malnutrition on 10/20  by nutritional  consultant. If possible, please document in progress notes and discharge summary:      The medical record reflects the following:  Risk Factors:   binge alcohol use, hx of chronic abdominal pain, hx of gastric sleeve in , recreational drug use. Poor/ no po intake x 10 days PTA  Clinical Indicators: Malnutrition Status:  Severe malnutrition (10/20/22 180)  Context:  Acute illness  Findings of the 6 clinical characteristics of malnutrition:  Energy Intake:  50% or less of est energy requirements for 5 or more days  Weight Loss:  Greater than 7.5% over 3 months  Body Fat Loss:  Unable to assess,  Muscle Mass Loss:  Unable to assess,  Fluid Accumulation:  No significant fluid accumulation,   Strength:  Not performed  Treatment:  1. Unjury once daily  2. Add double portion protein once daily    Thank you, Tete Hernández RN  CCDS  Options provided:  -- severe malnutrition confirmed present on admission  -- Other - I will add my own diagnosis  -- Disagree - Not applicable / Not valid  -- Disagree - Clinically unable to determine / Unknown  -- Refer to Clinical Documentation Reviewer    PROVIDER RESPONSE TEXT:    The diagnosis of severe malnutrition was confirmed as present on admission.     Query created by: Berta Dickens on 10/25/2022 12:34 PM      Electronically signed by:  Angely Childs MD 10/25/2022 3:30 PM

## 2023-06-30 NOTE — PROGRESS NOTES
Patient currently complaining of feeling nauseous. No medication on chart to give.  Stephan CASTANON. Alert and oriented to person, place and time

## (undated) DEVICE — MEDI-VAC NON-CONDUCTIVE SUCTION TUBING: Brand: CARDINAL HEALTH

## (undated) DEVICE — SYR 50ML SLIP TIP NSAF LF STRL --

## (undated) DEVICE — BITE BLOCK ENDOSCP UNIV AD 6 TO 9.4 MM

## (undated) DEVICE — ENDOSCOPY PUMP TUBING/ CAP SET: Brand: ERBE

## (undated) DEVICE — LINER SUCT CANSTR 3000CC PLAS SFT PRE ASSEMB W/OUT TBNG W/

## (undated) DEVICE — CANNULA NSL AD TBNG L14FT STD PVC O2 CRV CONN NONFLARED NSL

## (undated) DEVICE — BASIN EMSIS 16OZ GRAPHITE PLAS KID SHP MOLD GRAD FOR ORAL

## (undated) DEVICE — FLUFF AND POLYMER UNDERPAD,EXTRA HEAVY: Brand: WINGS

## (undated) DEVICE — SYRINGE MED 25GA 3ML L5/8IN SUBQ PLAS W/ DETACH NDL SFTY

## (undated) DEVICE — SOLUTION IRRIG 1000ML H2O STRL BLT

## (undated) DEVICE — CATHETER SUCT TR FL TIP 14FR W/ O CTRL

## (undated) DEVICE — FORCEPS BX L240CM JAW DIA2.4MM ORNG L CAP W/ NDL DISP RAD

## (undated) DEVICE — YANKAUER,SMOOTH HANDLE,HIGH CAPACITY: Brand: MEDLINE INDUSTRIES, INC.

## (undated) DEVICE — GAUZE,SPONGE,4"X4",16PLY,STRL,LF,10/TRAY: Brand: MEDLINE

## (undated) DEVICE — STERILE POLYISOPRENE POWDER-FREE SURGICAL GLOVES: Brand: PROTEXIS